# Patient Record
Sex: MALE
[De-identification: names, ages, dates, MRNs, and addresses within clinical notes are randomized per-mention and may not be internally consistent; named-entity substitution may affect disease eponyms.]

---

## 2017-06-14 NOTE — ADMIT CRITERIA FORM
Admission Criteria Documentation: 





            AMBULATORY SURGERY  EXCEPTION CRITERIA 





Ambulatory Surgery Exception Criteria


                                                                               (

Place 'X' for any and all applicable criteria): 





Surgery or procedure performed on ambulatory basis may require inpatient stay 

for[A] ANY ONE of the following(1)(2)(3)(4)(5)(6)(7)(8)(9):


[X] I.   A preoperative situation, condition, or finding that warrants 

inpatient stay as indicated by ANY  ONE of the following:


   []   a)   Inpatient care needed because of severity of a disease or 

condition rather than the surgery (eg, 


              severe cardiac or respiratory disease, severe infection) (15) (16

) (17) (18)


   []   b)   Emergent procedure (eg, angioplasty for acute ischemia)(19)


   []   c)   Complex surgical approach or situation as indicated by ANY  ONE of 

the following(3):


        []   i)    Open approach needed instead of usual endoscopic, 

transcatheter, or other less invasive procedure


        []   ii)   Difficult approach because of previous operation


        []   iii)  Airway monitoring required after open neck procedures(20)(21)


        []   iv)  Large mass requiring unusually extensive dissection


        []   v)  Additional complicating feature requiring inpatient care (eg, 

drain management)(22(23):


   [X]   d)   Major surgery in a pt with high anesthetic risk as indicated by 

ANY  ONE of the following (2)(3)(5)(7)(8):


        [X]   i)     ASA risk class III or higher (severe systemic disease 

impairing function) [D]


        []   ii)    Advanced age (eg, older than 85 years)(14)(24)             

                   


        []   iii)   Symptomatic heart failure(25)


        []   iv)   Symptomatic asthma or COPD(8)(21)


        []    v)   Morbid obesity with hemodynamic or respiratory problems(20)(

21)(26)(27)


        []   vi)   Obstructive sleep apnea(20)(21)


        []   vii)   Former premature infants who are younger than 60 weeks


        []   viii)  High risk for severe postoperative abnormalities (eg, 

severe postoperative hypocalcemia 


                    after parathyroidectomy for severe hyperparathyroidism)(27)(

28)


        []   ix)   Unstable angina(25) 


   []   e)  Drug-related risk requiring inpatient stay as indicated by ANY  ONE

  of the following(5)(10)(14)(32)(33)


        []   i)     Procedure requires discontinuing drugs or other therapy (eg

, antiarrhythmic    medication,   


                    antiseizure medication), which necessitates inpatient 

observation or treatment.(18)(31)


        []   ii)    Major surgery and high risk drug use as indicated by ANY  

ONE   of the following:


             []     1)    Active abuse of cocaine or similar drug      


             []     2)    Monoamine oxidase inhibitor use


             []     3)    Other drug identified as posing risk


   []   f)   Inadequate outpatient care situation as indicated by ANY  ONE  of 

the following(5)(10)(14)(32)(33)


        []   i)   Patient lives remote from medical facility and procedure has 

urgent complication potential, 


                  and temporary nearby residence cannot be arranged


        []  ii)   Patient will have postprocedure incapacitation and inadequate 

assistance at home, or alternative level of care cannot be arranged.


        []  iii)   Patient will have long general anesthesia or procedure side 

effect resolution time, and 


                  competent person to stay with patient on first postoperative 

night at home or alternative  level of care cannot be arranged.


        []iv)    Other inadequate outpatient situation that cannot be handled 

by other means


[] II.  A perioperative event, condition, or finding that warrants inpatient 

stay as indicated by ANY  ONE of the following (1)(2)(3):        


   []   a)   Inadequate physiologic recovery: cardiovascular,  respiratory, or 

hemodynamic status not normal or near preoperative baseline(18)                

                                                                               

          


   []   b)   Hemodynamic instability


   []   c)   Patient not alert with near normal or baseline mental status


   []   d)   Temperature not normal or as expected and not appropriate for 

outpatient treatment of condition 


   []   e)   Ambulatory or appropriate activity level status not yet achieved 

post procedure [E](34)(35)(36)


   []   f)    Operative site not appropriate (eg, unexpected or excessive 

drainage or bleeding)


   []   g)   Postoperative effects not resolved or adequately managed (eg, 

significant pain or vomiting not 


              appropriate for outpatient or next level of care)(10)(12)


   []   h)   Complicating features requiring inpatient care as indicated by ANY

  ONE  of the following(37):


        []   i)    Severe complications of procedure (eg, bowel injury, airway 

compromise, vascular injury,severe hemorrhage)


        []   ii)   Extensive (eg, dissection far beyond usual scope of procedure

) or prolonged (eg, 120 minutes  


                   beyond usual) surgery needed requiring inpatient 

postoperative care


        []   iii)  Conversion to an open or complex procedure that requires 

inpatient care (eg, open vs 


                   laparoscopic cholecystectomy, abdominal vs vaginal 

hysterectomy)(38)


        []   iv)  Comorbid condition or test result identified during or post 

procedure that requires inpatient care (7)


        []   v)   Malignant hyperthermia(30)


        []   vi)  Other complicating feature requiring inpatient care(22)(23)











Inpatient stay may be needed until ALL of the following are present (1)(2)(3)(4)

(5)(6)(10)(14)(33)(40):


[]a)   Physiologic recovery: cardiovascular, respiratory, and hemodynamic 

status normal or near preoperative baseline


[]b)   Hemodynamic stability    


[]c)   Patient alert, with near normal or baseline mental status


[]d)   Temperature appropriate: patient afebrile or temperature appropriate for 

outpt treatment of condition 


[]e)   Activity level appropriate: ambulatory or appropriate activity level 

post procedure


[]f)   Operative site appropriate as indicated by ALL of the following:


       []i)    Site dry or with expected drainage 


       []ii)   Any blood noted is as expected for procedure.


[]g)  Postoperative effects resolved or managed as indicated by ALL of the 

following:


       []i)    Pain management appropriate for outpatient (or next level of) 

care(10)


       []ii)   Minimal nausea and vomiting: if present, successfully treated 

with oral medication(12)


       []iii)   Headache, dizziness, or drowsiness (if present) are mild.


[]h)  Voiding status acceptable as indicated by ANY  ONE  of the following:


       []i)    Voiding spontaneously    


       []ii)   No voiding but instructions given for follow-up in 6 to 8 hours 


       []iii)  Urinary catheter in place, and instructions given for follow-up


[]i)   Complicating features requiring inpatient care manageable at a lower 

level of care(37)


[]j)   Comorbid conditions manageable at a lower level of care(37)











The original [employer] content created by [employer] has been revised. 


The portions of the content which have been revised are identified through the 

use of italic text or in bold, and [employer] 


has neither reviewed nor approved the modified material. All other unmodified 

content is copyright  [employer].





Please see references footnoted in the original [employer] edition 

2016

## 2017-06-15 ENCOUNTER — HOSPITAL ENCOUNTER (OUTPATIENT)
Dept: HOSPITAL 5 - OR | Age: 77
Discharge: HOME | End: 2017-06-15
Attending: UROLOGY
Payer: MEDICARE

## 2017-06-15 VITALS — SYSTOLIC BLOOD PRESSURE: 151 MMHG | DIASTOLIC BLOOD PRESSURE: 77 MMHG

## 2017-06-15 DIAGNOSIS — N40.0: ICD-10-CM

## 2017-06-15 DIAGNOSIS — N20.0: Primary | ICD-10-CM

## 2017-06-15 DIAGNOSIS — Z79.899: ICD-10-CM

## 2017-06-15 DIAGNOSIS — Z85.46: ICD-10-CM

## 2017-06-15 DIAGNOSIS — N28.1: ICD-10-CM

## 2017-06-15 DIAGNOSIS — N13.30: ICD-10-CM

## 2017-06-15 DIAGNOSIS — I10: ICD-10-CM

## 2017-06-15 DIAGNOSIS — N32.89: ICD-10-CM

## 2017-06-15 DIAGNOSIS — I25.2: ICD-10-CM

## 2017-06-15 DIAGNOSIS — N35.9: ICD-10-CM

## 2017-06-15 DIAGNOSIS — Z87.891: ICD-10-CM

## 2017-06-15 DIAGNOSIS — G47.33: ICD-10-CM

## 2017-06-15 PROCEDURE — 52332 CYSTOSCOPY AND TREATMENT: CPT

## 2017-06-15 PROCEDURE — C1758 CATHETER, URETERAL: HCPCS

## 2017-06-15 PROCEDURE — 93010 ELECTROCARDIOGRAM REPORT: CPT

## 2017-06-15 PROCEDURE — 87086 URINE CULTURE/COLONY COUNT: CPT

## 2017-06-15 PROCEDURE — A4217 STERILE WATER/SALINE, 500 ML: HCPCS

## 2017-06-15 PROCEDURE — 93005 ELECTROCARDIOGRAM TRACING: CPT

## 2017-06-15 PROCEDURE — C2617 STENT, NON-COR, TEM W/O DEL: HCPCS

## 2017-06-15 PROCEDURE — 82962 GLUCOSE BLOOD TEST: CPT

## 2017-06-15 PROCEDURE — C1769 GUIDE WIRE: HCPCS

## 2017-06-15 NOTE — POST OPERATIVE NOTE
Date of procedure: 06/15/17


Pre-op diagnosis: L renal // ureterqal stones 


Post-op diagnosis: same


Findings: 


hydro


Procedure: 





cysto L rpg j stent 


cancelled litho 


Anesthesia: GETA


Surgeon: HANG GRAJEDA


Estimated blood loss: none


Pathology: list (c an  s)


Specimen disposition: to lab


Condition: stable


Disposition: PACU

## 2017-06-15 NOTE — DISCHARGE SUMMARY
Short Stay Discharge Plan


Activity: other (no straining )


Weight Bearing Status: Full Weight Bearing


Diet: low fat, low cholesterol, low salt


Special Instructions: other (inc fluids )


Durable Medical Equipment Needed Upon Discharge: other (has j stent )


Follow up with: 


CLIFF DAVIS JR, MD [Primary Care Provider] - 7 Days


HANG GRAJEDA MD [Staff Physician] - 7 Days

## 2017-06-15 NOTE — ANESTHESIA CONSULTATION
Anesthesia Consult and Med Hx


Date of service: 06/15/17





- Airway


Anesthetic Teeth Evaluation: Dentures


ROM Head & Neck: Adequate


Mental/Hyoid Distance: Adequate


Mallampati Class: Class II


Intubation Access Assessment: Probably Good





- Pulmonary Exam


CTA: Yes





- Cardiac Exam


Cardiac Exam: RRR





- Pre-Operative Health Status


ASA Pre-Surgery Classification: ASA3


Proposed Anesthetic Plan: General





- Pulmonary


Hx Smoking: Yes (STOPPED X 60 YRS)


Hx Sleep Apnea: No (JUAN M PRE SCREEN HIGH RISK)





- Cardiovascular System


Hx Heart Attack/AMI: Yes (? 10 yrs ago)





- Central Nervous System


Hx Back Pain: Yes (FROM KIDNEY STONE)

## 2017-06-15 NOTE — ANESTHESIA DAY OF SURGERY
Anesthesia Day of Surgery





- Day of Surgery


Patient Examined: Yes


Patient H&P Reviewed: Yes


Patient is NPO: Yes


Beta Blockers: Yes

## 2017-06-15 NOTE — OPERATIVE REPORT
PREOPERATIVE DIAGNOSES:

1.  Renal cyst.

2.  Large renal stones obstructing left.

3.  Enlarged prostate.



POSTOPERATIVE DIAGNOSES:

1.  Renal cyst.

2.  Large renal stones obstructing left.

3.  Enlarged prostate.



PROCEDURE:  Cystoscopy with left retrograde, left double-J stent and collection

of left renal pelvic urine for culture.



SURGEON:  Grey Knight MD



ANESTHESIA:  General.



FINDINGS:  The gentleman with large stones left kidney, multiple renal cysts,

who now presents for treatment.  All risks and complications were discussed with

him and his daughter.



DESCRIPTION OF PROCEDURE:  The patient was brought to lithotripsy suite, placed

on the operating table.  Stones could not be localized.  This could be because

of their consistency, they may have mostly uric acid.  The patient was then

placed in the lithotomy position and he had mild benign prostatic hypertrophy

and some narrowing of the urethra, which was easily bypassed with the scope. 

There was 2+ trabeculation.  Retrograde showed a very narrowed left orifice with

stones.  I suspect that the UPJ which were radiolucent and in the lower pole

calyx.  A Glidewire coiled up in the kidney and we placed 6-British ____.



When we placed the first Glidewire, there was purulent material from the left

side, so we decided to abandon the lithotripsy.  There was moderate

hydronephrosis and a good diuresis.  Open-ended catheter was placed for culture.

 The patient tolerated the procedure well.  We placed a double-J stent in good

position.  Family notified.  Followup for possible ureteroscopy and possible

alkalinization.  I explained this in detail to the daughter.





DD: 06/15/2017 11:02

DT: 06/15/2017 13:20

JOB# [id number]  [id number]

ELMER/BARBARA

## 2017-07-12 ENCOUNTER — HOSPITAL ENCOUNTER (OUTPATIENT)
Dept: HOSPITAL 5 - OR | Age: 77
Discharge: HOME | End: 2017-07-12
Attending: UROLOGY
Payer: MEDICARE

## 2017-07-12 VITALS — DIASTOLIC BLOOD PRESSURE: 90 MMHG | SYSTOLIC BLOOD PRESSURE: 156 MMHG

## 2017-07-12 DIAGNOSIS — Z79.899: ICD-10-CM

## 2017-07-12 DIAGNOSIS — N20.2: Primary | ICD-10-CM

## 2017-07-12 DIAGNOSIS — I10: ICD-10-CM

## 2017-07-12 DIAGNOSIS — Z95.1: ICD-10-CM

## 2017-07-12 DIAGNOSIS — I25.10: ICD-10-CM

## 2017-07-12 DIAGNOSIS — Z87.891: ICD-10-CM

## 2017-07-12 DIAGNOSIS — Z85.46: ICD-10-CM

## 2017-07-12 DIAGNOSIS — E11.9: ICD-10-CM

## 2017-07-12 PROCEDURE — C1726 CATH, BAL DIL, NON-VASCULAR: HCPCS

## 2017-07-12 PROCEDURE — 82962 GLUCOSE BLOOD TEST: CPT

## 2017-07-12 PROCEDURE — C2617 STENT, NON-COR, TEM W/O DEL: HCPCS

## 2017-07-12 PROCEDURE — C1758 CATHETER, URETERAL: HCPCS

## 2017-07-12 PROCEDURE — C1769 GUIDE WIRE: HCPCS

## 2017-07-12 PROCEDURE — 74420 UROGRAPHY RTRGR +-KUB: CPT

## 2017-07-12 PROCEDURE — 52356 CYSTO/URETERO W/LITHOTRIPSY: CPT

## 2017-07-12 NOTE — ANESTHESIA DAY OF SURGERY
Anesthesia Day of Surgery





- Day of Surgery


Patient Examined: Yes


Patient H&P Reviewed: Yes


Patient is NPO: Yes


Beta Blockers: No (did not take this am)

## 2017-07-12 NOTE — POST OPERATIVE NOTE
Date of procedure: 07/12/17


Pre-op diagnosis: ureteral stones


Post-op diagnosis: same


Findings: 





As above


Procedure: 





Cystoscopy left flexible ureteroscopy


Op note


Preoperative diagnosis left ureteral and renal stones


Postoperative diagnosis the same procedure cystoscopy stent exchange left 

ureteroscopy left retrograde left rigid and flexible ureteroscopy stone 

extraction left nephroscopy laser of stones double-J stent


Surgeon Dr. RITU Pedraza Gen.


Findings as above


Procedure


This was brought to the operating room placed on the operating table.


Following the induction of general anesthesia placed in lithotomy position 

prepped and draped in usual sterile fashion


The stent was revealed and removed over a wire in the kidney


We tried flexible ureteroscopy but we saw prominent stones in the ureter could 

not get tested.  We then used the rigid ureteroscope and laser to stone and 

remove multiple fragments to be given to the patient's family.  At this point 

the ureter was quite narrow we used the access sheath to the mid ureter and was 

able to pass the flexible scope to the kidney.


Large stones were seen which were yellow not visualized on the radiographic 

studies so could not be done with ESWL


At this point the stones were followed into the lower and mid calyces and 

fragmented into multiple pieces with the 200 fiber laser


Patient of procedure well a 7 Hungarian 24 cm double-J was replaced and was 

brought to recovery room in stable condition without the string minimal blood 

loss


















































Anesthesia: ANTONIOA


Surgeon: HANG GRAJEDA


Estimated blood loss: minimal


Pathology: none


Condition: stable


Disposition: PACU

## 2017-07-12 NOTE — DISCHARGE SUMMARY
Short Stay Discharge Plan


Activity: other (no straining)


Weight Bearing Status: Full Weight Bearing


Diet: low cholesterol, low salt


Special Instructions: other (increase fluids)


Durable Medical Equipment Needed Upon Discharge: other (patient has double-J 

stent needs follow-up)


Follow up with: 


CLIFF DAVIS JR, MD [Primary Care Provider] - 7 Days


HANG GRAJEDA MD [Staff Physician] - 7 Days

## 2017-07-12 NOTE — ANESTHESIA CONSULTATION
Anesthesia Consult and Med Hx


Date of service: 07/12/17





- Airway


Anesthetic Teeth Evaluation: Edentulous


ROM Head & Neck: Adequate


Mental/Hyoid Distance: Adequate


Mallampati Class: Class II


Intubation Access Assessment: Probably Good





- Pulmonary Exam


CTA: Yes





- Cardiac Exam


Cardiac Exam: RRR





- Pre-Operative Health Status


ASA Pre-Surgery Classification: ASA3


Proposed Anesthetic Plan: General





- Pulmonary


Hx Smoking: Yes (STOPPED X 60 YRS)


Hx Sleep Apnea: No (JUAN M PRE SCREEN HIGH RISK)





- Cardiovascular System


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


Hx Angina: No (Patient is active with no chest pain or tightness)





- Central Nervous System


Hx Back Pain: Yes (FROM KIDNEY STONE)





- Endocrine


Hx Insulin Dependent Diabetes: Yes





- Additional Comments


Anesthesia Medical History Comments: Recently had cardiologist extend his check 

up interval from 3 months to 1 year

## 2017-07-13 NOTE — FLUOROSCOPY REPORT
FLUOROSCOPY RETROGRADE UROGRAPHY



History: Left ureteral stones.



Findings: No comparison. Fluoroscopy was provided by radiology during 

retrograde urography by Dr. Knight.  film demonstrates a left 

ureteral stent which is in good position and densities overlying the 

course of the left ureter consistent with ureteral stones. Left 

ureteroscopy, laser and grasper was used to remove the left ureteral 

stones. The left ureteral stent was exchanged which is in good position 

on the final image. Please correlate with the procedural report.



Impression:

Removal of left ureteral stones. Left ureteral stent exchange.

## 2019-12-02 ENCOUNTER — HOSPITAL ENCOUNTER (OUTPATIENT)
Dept: HOSPITAL 5 - OR | Age: 79
Discharge: HOME | End: 2019-12-02
Attending: UROLOGY
Payer: MEDICARE

## 2019-12-02 VITALS — SYSTOLIC BLOOD PRESSURE: 130 MMHG | DIASTOLIC BLOOD PRESSURE: 62 MMHG

## 2019-12-02 DIAGNOSIS — Z87.891: ICD-10-CM

## 2019-12-02 DIAGNOSIS — I10: ICD-10-CM

## 2019-12-02 DIAGNOSIS — Z98.42: ICD-10-CM

## 2019-12-02 DIAGNOSIS — Z79.899: ICD-10-CM

## 2019-12-02 DIAGNOSIS — E78.2: ICD-10-CM

## 2019-12-02 DIAGNOSIS — N35.919: ICD-10-CM

## 2019-12-02 DIAGNOSIS — Z79.4: ICD-10-CM

## 2019-12-02 DIAGNOSIS — Z79.82: ICD-10-CM

## 2019-12-02 DIAGNOSIS — Z98.41: ICD-10-CM

## 2019-12-02 DIAGNOSIS — I65.23: ICD-10-CM

## 2019-12-02 DIAGNOSIS — Z79.84: ICD-10-CM

## 2019-12-02 DIAGNOSIS — I25.810: ICD-10-CM

## 2019-12-02 DIAGNOSIS — Z95.2: ICD-10-CM

## 2019-12-02 DIAGNOSIS — R31.0: Primary | ICD-10-CM

## 2019-12-02 DIAGNOSIS — E11.9: ICD-10-CM

## 2019-12-02 DIAGNOSIS — Z95.1: ICD-10-CM

## 2019-12-02 PROCEDURE — 74420 UROGRAPHY RTRGR +-KUB: CPT

## 2019-12-02 PROCEDURE — 82962 GLUCOSE BLOOD TEST: CPT

## 2019-12-02 PROCEDURE — C1758 CATHETER, URETERAL: HCPCS

## 2019-12-02 PROCEDURE — 88305 TISSUE EXAM BY PATHOLOGIST: CPT

## 2019-12-02 PROCEDURE — 52224 CYSTOSCOPY AND TREATMENT: CPT

## 2019-12-02 NOTE — POST OPERATIVE NOTE
Date of procedure: 12/02/19


Pre-op diagnosis: hematuria 


Post-op diagnosis: same


Findings: 





bladder tumors 


Procedure: 





cysto turbt rpgs 


Anesthesia: GETA


Surgeon: HANG GRAJEDA


Estimated blood loss: minimal


Pathology: list (bladder)


Specimen disposition: to lab


Condition: stable


Disposition: PACU

## 2019-12-02 NOTE — ANESTHESIA CONSULTATION
Anesthesia Consult and Med Hx


Date of service: 12/02/19





- Airway


Anesthetic Teeth Evaluation: Edentulous


ROM Head & Neck: Adequate


Mental/Hyoid Distance: Adequate


Mallampati Class: Class II


Intubation Access Assessment: Good





- Pre-Operative Health Status


ASA Pre-Surgery Classification: ASA3


Proposed Anesthetic Plan: General





- Pulmonary


Hx Smoking: Yes (STOPPED X 60 YRS)


Hx Sleep Apnea: No (JUAN M PRE SCREEN HIGH RISK)





- Cardiovascular System


Hx Hypertension: Yes


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


Hx Angina: No (Patient is active with no chest pain or tightness)


Hx Valvular Heart Disease: Yes (AVR)


Hx Peripheral Vascular Disease: Yes (Had CEA 7 weeks ago. +Cardiac clearance 

(ECHO, NST))





- Central Nervous System


Hx Back Pain: Yes (FROM KIDNEY STONE)


Hx Psychiatric Problems: No





- Gastrointestinal


Hx Gastroesophageal Reflux Disease: Yes





- Endocrine


Hx Insulin Dependent Diabetes: Yes





- Hematic


Hx Anemia: No





- Other Systems


Hx Alcohol Use: No


Hx Substance Use: No


Hx Cancer: Yes (Prostate)

## 2019-12-02 NOTE — FLUOROSCOPY REPORT
Retrograde ureterography. 12/2/2019.



HISTORY: Gross hematuria.



FINDINGS: Retrograde injection of the right renal collecting system. A filling defect at the UVJ is n
onobstructing. This may represent an injected air bubble. Mild irregularity is seen at the distal ure
ter. No significant obstruction.



Injection of the left collecting system is unremarkable.



Fluoroscopy time: 30 seconds. 6 fluoroscopic images were obtained.



Signer Name: Jb Link MD 

Signed: 12/2/2019 10:59 AM

 Workstation Name: [id number]

## 2019-12-02 NOTE — OPERATIVE REPORT
PREOPERATIVE DIAGNOSES:  Gross hematuria, previous radiation for prostate.



POSTOPERATIVE DIAGNOSIS:  Two papillary bladder tumors.



PROCEDURE:  Cystoscopy, transurethral resection of bladder tumor.



SURGEON:  Dr. Knight.



ANESTHESIA:  General.



FINDINGS:  This is a gentleman who presented with gross hematuria.  CT was

unremarkable for any significant pathology, but he continues to bleed.  He was

on blood thinners.  He had to have a carotid surgery.  He now presents for

treatment.



DESCRIPTION OF PROCEDURE:  The patient was brought to the operating room and

placed on the operating table.  Following induction of anesthesia, he was placed

in lithotomy position, prepped and draped in usual sterile fashion.  There was a

urethral stricture that had to be dilated.  Once we were able to get into the

bladder, the urethral epithelium was blanched.  Bladder neck was open.  We got

into the bladder and there were two tumors, one on the right posterior lateral

wall and one at the dome.  Retrograde showed good filling and good drainage. 

The tumors were extracted.  The smaller one was grabbed with a rigid biopsy

instrument and then deeper biopsies were taken.  The larger one was resected. 

The patient tolerated the procedure well.  We did see muscle fibers.  There were

no complications.  The area was cauterized.  A 22 Councill catheter was placed,

brought to recovery in stable condition.





DD: 12/02/2019 10:27

DT: 12/02/2019 10:35

JOB# [id number]  [id number]

ELMER/BARBARA

## 2019-12-02 NOTE — DISCHARGE SUMMARY
Short Stay Discharge Plan


Activity: other (no straining )


Weight Bearing Status: Full Weight Bearing


Diet: low fat, low cholesterol


Special Instructions: other (inc fluids )


Durable Medical Equipment Needed Upon Discharge: other (davis )


Follow up with: 


CLIFF DAVIS JR, MD [Primary Care Provider] - 7 Days


HANG GRAJEDA MD [Staff Physician] - 7 Days

## 2020-03-16 ENCOUNTER — HOSPITAL ENCOUNTER (OUTPATIENT)
Dept: HOSPITAL 5 - OR | Age: 80
Discharge: HOME | End: 2020-03-16
Attending: UROLOGY
Payer: MEDICARE

## 2020-03-16 VITALS — DIASTOLIC BLOOD PRESSURE: 77 MMHG | SYSTOLIC BLOOD PRESSURE: 130 MMHG

## 2020-03-16 DIAGNOSIS — R31.9: ICD-10-CM

## 2020-03-16 DIAGNOSIS — Z85.46: ICD-10-CM

## 2020-03-16 DIAGNOSIS — Z95.2: ICD-10-CM

## 2020-03-16 DIAGNOSIS — M19.90: ICD-10-CM

## 2020-03-16 DIAGNOSIS — Z98.890: ICD-10-CM

## 2020-03-16 DIAGNOSIS — C67.4: Primary | ICD-10-CM

## 2020-03-16 DIAGNOSIS — Z95.1: ICD-10-CM

## 2020-03-16 DIAGNOSIS — K21.9: ICD-10-CM

## 2020-03-16 DIAGNOSIS — Z79.899: ICD-10-CM

## 2020-03-16 DIAGNOSIS — Z79.84: ICD-10-CM

## 2020-03-16 DIAGNOSIS — Z90.49: ICD-10-CM

## 2020-03-16 DIAGNOSIS — Z98.49: ICD-10-CM

## 2020-03-16 DIAGNOSIS — I10: ICD-10-CM

## 2020-03-16 DIAGNOSIS — Z79.82: ICD-10-CM

## 2020-03-16 DIAGNOSIS — E78.00: ICD-10-CM

## 2020-03-16 DIAGNOSIS — E11.51: ICD-10-CM

## 2020-03-16 DIAGNOSIS — I73.9: ICD-10-CM

## 2020-03-16 DIAGNOSIS — Z87.442: ICD-10-CM

## 2020-03-16 DIAGNOSIS — Z79.4: ICD-10-CM

## 2020-03-16 DIAGNOSIS — Z87.891: ICD-10-CM

## 2020-03-16 DIAGNOSIS — I25.10: ICD-10-CM

## 2020-03-16 PROCEDURE — 52234 CYSTOSCOPY AND TREATMENT: CPT

## 2020-03-16 PROCEDURE — 82962 GLUCOSE BLOOD TEST: CPT

## 2020-03-16 PROCEDURE — A4217 STERILE WATER/SALINE, 500 ML: HCPCS

## 2020-03-16 PROCEDURE — 88305 TISSUE EXAM BY PATHOLOGIST: CPT

## 2020-03-16 RX ADMIN — FENTANYL CITRATE PRN MCG: 50 INJECTION, SOLUTION INTRAMUSCULAR; INTRAVENOUS at 10:09

## 2020-03-16 RX ADMIN — FENTANYL CITRATE PRN MCG: 50 INJECTION, SOLUTION INTRAMUSCULAR; INTRAVENOUS at 10:20

## 2020-03-16 NOTE — DISCHARGE SUMMARY
Short Stay Discharge Plan


Activity: other (no straining )


Weight Bearing Status: Full Weight Bearing


Diet: low fat, low cholesterol, low salt


Special Instructions: other (davis care )


Durable Medical Equipment Needed Upon Discharge: other (catheter)


Follow up with: 


CLIFF DAVIS JR, MD [Primary Care Provider] - 7 Days


HANG GRAJEDA MD [Staff Physician] - 7 Days

## 2020-03-16 NOTE — OPERATIVE REPORT
PREOPERATIVE DIAGNOSIS:  The patient has recurrent bladder cancer.



POSTOPERATIVE DIAGNOSIS:  The patient has recurrent bladder cancer.



PROCEDURE:  Resection of 2 small bladder tumors.



SURGEON:  Dr. Knight.



ANESTHESIA:  General.



FINDINGS:  This is a gentleman with history of papillary bladder tumors and

intermittent hematuria.  He now presents for treatment.



PROCEDURE:  The patient was brought to the operating room and placed on the

operating table.  Following induction of anesthesia, placed in lithotomy

position, prepped and draped in usual sterile fashion.  Cystourethroscopy showed

a tumor, probably a 9 mm posterior wall, which was extracted and cauterized.  At

the bladder neck, there was a tumor at the 5 o'clock position, elevated, looked

a little papillary.  This was resected with the 24 resectoscope.  The patient

tolerated the procedure well.  No significant bleeding.  Area was cauterized.  A

22-coude catheter was placed, brought to recovery in stable condition.





DD: 03/16/2020 12:08

DT: 03/16/2020 12:27

JOB# [id number]  [id number]

ELMER/BARBARA

## 2020-03-16 NOTE — ANESTHESIA DAY OF SURGERY
Anesthesia Day of Surgery





- Day of Surgery


Patient Examined: Yes


Patient H&P Reviewed: Yes


Patient is NPO: Yes


Beta Blockers: Yes (metoprolol this morning)

## 2020-03-16 NOTE — ANESTHESIA CONSULTATION
Anesthesia Consult and Med Hx


Date of service: 03/16/20





- Airway


Anesthetic Teeth Evaluation: Edentulous


ROM Head & Neck: Adequate


Mental/Hyoid Distance: Adequate


Mallampati Class: Class II


Intubation Access Assessment: Probably Good (unable to access previous anes 

record for review)





- Pulmonary Exam


CTA: Yes





- Cardiac Exam


Cardiac Exam: RRR





- Pre-Operative Health Status


ASA Pre-Surgery Classification: ASA3


Proposed Anesthetic Plan: General





- Pulmonary


Hx Smoking: Yes (STOPPED X 60 YRS)


Hx Respiratory Symptoms: No


Hx Sleep Apnea: No (JUAN M PRE SCREEN HIGH RISK)





- Cardiovascular System


Hx Hypertension: Yes (took metoprolol and enalapril this mornign)


Hx Coronary Artery Disease: Yes (s/p CABG >10yrs ago)


Hx Angina: No


Hx Percutaneous Transluminal Coronary Angioplasty (PTCA): Yes (>10 yrs ago)


Hx Cardia Arrhythmia: No


Hx Valvular Heart Disease: Yes (remote hx AVR)


Hx Peripheral Vascular Disease: Yes





- Central Nervous System


CVA: No





- Gastrointestinal


Hx Gastroesophageal Reflux Disease: Yes (diet controlled)





- Endocrine


Hx Renal Disease: No


Hx Liver Disease: No


Hx Insulin Dependent Diabetes: Yes


Hx Thyroid Disease: No





- Other Systems


Hx Cancer: Yes (hx bladder ca)


Hx Obesity: No





- Additional Comments


Anesthesia Medical History Comments: No hx anesthetic complications.

## 2020-07-10 LAB
ALBUMIN SERPL-MCNC: 4.2 G/DL (ref 3.9–5)
ALT SERPL-CCNC: 15 UNITS/L (ref 7–56)
BUN SERPL-MCNC: 40 MG/DL (ref 9–20)
BUN/CREAT SERPL: 25 %
CALCIUM SERPL-MCNC: 9.8 MG/DL (ref 8.4–10.2)
HCT VFR BLD CALC: 29.3 % (ref 35.5–45.6)
HEMOLYSIS INDEX: 1
HGB BLD-MCNC: 8.8 GM/DL (ref 11.8–15.2)
MCHC RBC AUTO-ENTMCNC: 30 % (ref 32–34)
MCV RBC AUTO: 73 FL (ref 84–94)
PLATELET # BLD: 256 K/MM3 (ref 140–440)
RBC # BLD AUTO: 4.04 M/MM3 (ref 3.65–5.03)

## 2020-07-13 ENCOUNTER — HOSPITAL ENCOUNTER (OUTPATIENT)
Dept: HOSPITAL 5 - OR | Age: 80
Discharge: HOME | End: 2020-07-13
Attending: UROLOGY
Payer: MEDICARE

## 2020-07-13 VITALS — SYSTOLIC BLOOD PRESSURE: 138 MMHG | DIASTOLIC BLOOD PRESSURE: 75 MMHG

## 2020-07-13 DIAGNOSIS — Z98.49: ICD-10-CM

## 2020-07-13 DIAGNOSIS — C67.5: ICD-10-CM

## 2020-07-13 DIAGNOSIS — Z90.49: ICD-10-CM

## 2020-07-13 DIAGNOSIS — E78.00: ICD-10-CM

## 2020-07-13 DIAGNOSIS — M19.90: ICD-10-CM

## 2020-07-13 DIAGNOSIS — C67.4: Primary | ICD-10-CM

## 2020-07-13 DIAGNOSIS — Z85.46: ICD-10-CM

## 2020-07-13 DIAGNOSIS — Z95.1: ICD-10-CM

## 2020-07-13 DIAGNOSIS — Z87.442: ICD-10-CM

## 2020-07-13 DIAGNOSIS — I10: ICD-10-CM

## 2020-07-13 DIAGNOSIS — Z11.59: ICD-10-CM

## 2020-07-13 DIAGNOSIS — K21.9: ICD-10-CM

## 2020-07-13 DIAGNOSIS — I25.10: ICD-10-CM

## 2020-07-13 DIAGNOSIS — Z86.2: ICD-10-CM

## 2020-07-13 DIAGNOSIS — E11.51: ICD-10-CM

## 2020-07-13 DIAGNOSIS — Z95.4: ICD-10-CM

## 2020-07-13 DIAGNOSIS — Z98.890: ICD-10-CM

## 2020-07-13 DIAGNOSIS — I73.9: ICD-10-CM

## 2020-07-13 LAB
ALBUMIN SERPL-MCNC: 3.8 G/DL (ref 3.9–5)
ALT SERPL-CCNC: 12 UNITS/L (ref 7–56)
BUN SERPL-MCNC: 33 MG/DL (ref 9–20)
BUN/CREAT SERPL: 25 %
CALCIUM SERPL-MCNC: 9.2 MG/DL (ref 8.4–10.2)
HEMOLYSIS INDEX: 3

## 2020-07-13 PROCEDURE — 36415 COLL VENOUS BLD VENIPUNCTURE: CPT

## 2020-07-13 PROCEDURE — 52234 CYSTOSCOPY AND TREATMENT: CPT

## 2020-07-13 PROCEDURE — 88305 TISSUE EXAM BY PATHOLOGIST: CPT

## 2020-07-13 PROCEDURE — 52204 CYSTOSCOPY W/BIOPSY(S): CPT

## 2020-07-13 PROCEDURE — A4217 STERILE WATER/SALINE, 500 ML: HCPCS

## 2020-07-13 PROCEDURE — 80053 COMPREHEN METABOLIC PANEL: CPT

## 2020-07-13 PROCEDURE — 85027 COMPLETE CBC AUTOMATED: CPT

## 2020-07-13 PROCEDURE — 86901 BLOOD TYPING SEROLOGIC RH(D): CPT

## 2020-07-13 PROCEDURE — 86900 BLOOD TYPING SEROLOGIC ABO: CPT

## 2020-07-13 PROCEDURE — 86850 RBC ANTIBODY SCREEN: CPT

## 2020-07-13 PROCEDURE — 82962 GLUCOSE BLOOD TEST: CPT

## 2020-07-13 NOTE — OPERATIVE REPORT
PREOPERATIVE DIAGNOSIS:  Recurrent bladder tumors.



POSTOPERATIVE DIAGNOSIS:  Recurrent bladder tumors.



PROCEDURE:  Cystoscopy, transurethral resection of bladder tumors and excision

of bladder tumors fulguration.



SURGEON:  Dr. Knight.



ANESTHESIA:  General.



DESCRIPTION OF PROCEDURE:  This is a gentleman with recurrent tumors.  He had

two very tiny tumors posterior wall separate about 4 cm apart.  These were very

small, measuring about 8 mm each.  These were excised.  At the bladder neck,

there was some papillary areas which were resected.  The patient tolerated the

procedure well.  Specimen sent separately to pathology.  A random posterior wall

biopsy was also done.  The patient tolerated the procedure well and brought to

recovery room with 22 coude urine clear in stable condition.





DD: 07/13/2020 09:09

DT: 07/13/2020 09:18

JOB# [id number]  [id number]

ELMER/BARBARA

## 2020-07-13 NOTE — POST OPERATIVE NOTE
Date of procedure: 07/13/20


Pre-op diagnosis: bladder tumor 


Post-op diagnosis: same


Findings: 





small lesions 


Procedure: 





cysto turbt bx 


Anesthesia: GETA


Surgeon: HANG GRAJEDA


Estimated blood loss: minimal


Pathology: list (bladder)


Specimen disposition: to lab


Condition: stable


Disposition: PACU

## 2020-07-13 NOTE — ANESTHESIA CONSULTATION
Anesthesia Consult and Med Hx


Date of service: 07/13/20





- Airway


Anesthetic Teeth Evaluation: Dentures (upper and lower), Edentulous


ROM Head & Neck: Adequate


Mental/Hyoid Distance: Adequate


Mallampati Class: Class III


Intubation Access Assessment: Possibly Difficult





- Pre-Operative Health Status


ASA Pre-Surgery Classification: ASA3


Proposed Anesthetic Plan: General





- Pulmonary


Hx Smoking: Yes (STOPPED X 60 YRS)


Hx Respiratory Symptoms: No


Hx Sleep Apnea: No (JUAN M PRE SCREEN HIGH RISK  )





- Cardiovascular System


Hx Hypertension: Yes


Hx Coronary Artery Disease: Yes (CABG x3 (2014))


Hx Heart Attack/AMI: No


Hx Angina: No


Hx Percutaneous Transluminal Coronary Angioplasty (PTCA): Yes (>10 yrs ago)


Hx Cardia Arrhythmia: No


Hx Valvular Heart Disease: Yes (Aortic valve replacement (2014))


Hx Peripheral Vascular Disease: Yes





- Central Nervous System


CVA: No


Hx Back Pain: Yes (FROM KIDNEY STONE)


Hx Psychiatric Problems: No





- Gastrointestinal


Hx Gastroesophageal Reflux Disease: Yes (diet controlled)





- Endocrine


Hx Renal Disease: Yes (kidney stones, h/o bladder CA, prostate CA)


Hx Liver Disease: No


Hx Insulin Dependent Diabetes: Yes


Hx Thyroid Disease: No





- Hematic


Hx Anemia: Yes





- Other Systems


Hx Alcohol Use: No


Hx Substance Use: No


Hx Cancer: Yes (hx bladder ca, Prostate CA (2009) )


Hx Obesity: No

## 2020-07-13 NOTE — DISCHARGE SUMMARY
Short Stay Discharge Plan


Activity: other (no straining )


Weight Bearing Status: Full Weight Bearing


Diet: low fat, low cholesterol, low salt


Special Instructions: other (inc fluids )


Durable Medical Equipment Needed Upon Discharge: other (teach davis care )


Follow up with: 


CLIFF DAVIS JR, MD [Primary Care Provider] - 7 Days


HANG GRAJEDA MD [Staff Physician] - 3 Days


Forms:  [facility]

## 2020-12-23 LAB
ALBUMIN SERPL-MCNC: 3.9 G/DL (ref 3.9–5)
ALT SERPL-CCNC: 14 UNITS/L (ref 7–56)
BUN SERPL-MCNC: 33 MG/DL (ref 9–20)
BUN/CREAT SERPL: 24 %
CALCIUM SERPL-MCNC: 9.2 MG/DL (ref 8.4–10.2)
HCT VFR BLD CALC: 30.6 % (ref 35.5–45.6)
HEMOLYSIS INDEX: 2
HGB BLD-MCNC: 9.4 GM/DL (ref 11.8–15.2)
MCHC RBC AUTO-ENTMCNC: 31 % (ref 32–34)
MCV RBC AUTO: 77 FL (ref 84–94)
PLATELET # BLD: 260 K/MM3 (ref 140–440)
RBC # BLD AUTO: 3.98 M/MM3 (ref 3.65–5.03)

## 2020-12-28 ENCOUNTER — HOSPITAL ENCOUNTER (OUTPATIENT)
Dept: HOSPITAL 5 - OR | Age: 80
Discharge: HOME | End: 2020-12-28
Attending: UROLOGY
Payer: MEDICARE

## 2020-12-28 VITALS — DIASTOLIC BLOOD PRESSURE: 69 MMHG | SYSTOLIC BLOOD PRESSURE: 127 MMHG

## 2020-12-28 DIAGNOSIS — Z98.890: ICD-10-CM

## 2020-12-28 DIAGNOSIS — Z79.4: ICD-10-CM

## 2020-12-28 DIAGNOSIS — E78.00: ICD-10-CM

## 2020-12-28 DIAGNOSIS — I25.10: ICD-10-CM

## 2020-12-28 DIAGNOSIS — Z79.82: ICD-10-CM

## 2020-12-28 DIAGNOSIS — Z98.49: ICD-10-CM

## 2020-12-28 DIAGNOSIS — C67.1: Primary | ICD-10-CM

## 2020-12-28 DIAGNOSIS — Z85.46: ICD-10-CM

## 2020-12-28 DIAGNOSIS — Z86.2: ICD-10-CM

## 2020-12-28 DIAGNOSIS — K21.9: ICD-10-CM

## 2020-12-28 DIAGNOSIS — Z79.899: ICD-10-CM

## 2020-12-28 DIAGNOSIS — Z95.1: ICD-10-CM

## 2020-12-28 DIAGNOSIS — Z87.891: ICD-10-CM

## 2020-12-28 DIAGNOSIS — E11.51: ICD-10-CM

## 2020-12-28 DIAGNOSIS — Z87.442: ICD-10-CM

## 2020-12-28 DIAGNOSIS — Z90.49: ICD-10-CM

## 2020-12-28 DIAGNOSIS — Z20.828: ICD-10-CM

## 2020-12-28 DIAGNOSIS — M19.90: ICD-10-CM

## 2020-12-28 DIAGNOSIS — Z95.2: ICD-10-CM

## 2020-12-28 DIAGNOSIS — I10: ICD-10-CM

## 2020-12-28 PROCEDURE — 85027 COMPLETE CBC AUTOMATED: CPT

## 2020-12-28 PROCEDURE — 74420 UROGRAPHY RTRGR +-KUB: CPT

## 2020-12-28 PROCEDURE — 36415 COLL VENOUS BLD VENIPUNCTURE: CPT

## 2020-12-28 PROCEDURE — C1758 CATHETER, URETERAL: HCPCS

## 2020-12-28 PROCEDURE — 82962 GLUCOSE BLOOD TEST: CPT

## 2020-12-28 PROCEDURE — A4217 STERILE WATER/SALINE, 500 ML: HCPCS

## 2020-12-28 PROCEDURE — 52240 CYSTOSCOPY AND TREATMENT: CPT

## 2020-12-28 PROCEDURE — U0003 INFECTIOUS AGENT DETECTION BY NUCLEIC ACID (DNA OR RNA); SEVERE ACUTE RESPIRATORY SYNDROME CORONAVIRUS 2 (SARS-COV-2) (CORONAVIRUS DISEASE [COVID-19]), AMPLIFIED PROBE TECHNIQUE, MAKING USE OF HIGH THROUGHPUT TECHNOLOGIES AS DESCRIBED BY CMS-2020-01-R: HCPCS

## 2020-12-28 PROCEDURE — 80053 COMPREHEN METABOLIC PANEL: CPT

## 2020-12-28 PROCEDURE — 88305 TISSUE EXAM BY PATHOLOGIST: CPT

## 2020-12-28 NOTE — ANESTHESIA CONSULTATION
Anesthesia Consult and Med Hx


Date of service: 12/28/20





- Airway


Anesthetic Teeth Evaluation: Good


ROM Head & Neck: Adequate


Mental/Hyoid Distance: Adequate


Mallampati Class: Class II


Intubation Access Assessment: Good





- Pulmonary Exam


CTA: Yes





- Cardiac Exam


Cardiac Exam: RRR





- Pre-Operative Health Status


ASA Pre-Surgery Classification: ASA3


Proposed Anesthetic Plan: General





- Pulmonary


Hx Smoking: Yes (STOPPED X 60 YRS)


Hx Respiratory Symptoms: No


Hx Sleep Apnea: No (JUAN M PRE SCREEN HIGH RISK  )





- Cardiovascular System


Hx Hypertension: Yes


Hx Coronary Artery Disease: Yes (CABG x3 (2014),  L CEA 6 months ago. R CEA last

year)


Hx Heart Attack/AMI: No


Hx Angina: No


Hx Percutaneous Transluminal Coronary Angioplasty (PTCA): Yes (>10 yrs ago)


Hx Cardia Arrhythmia: No


Hx Valvular Heart Disease: Yes (Aortic valve replacement (2014))


Hx Peripheral Vascular Disease: Yes (VARICOSITIES OF LEGS)





- Central Nervous System


CVA: No


Hx Back Pain: Yes


Hx Psychiatric Problems: No





- Gastrointestinal


Hx Gastroesophageal Reflux Disease: Yes (diet controlled)





- Endocrine


Hx Renal Disease: Yes (kidney stones, h/o bladder CA, prostate CA)


Hx Liver Disease: No


Hx Insulin Dependent Diabetes: Yes


Hx Thyroid Disease: No





- Hematic


Hx Anemia: Yes





- Other Systems


Hx Alcohol Use: No


Hx Substance Use: No


Hx Cancer: Yes (hx bladder ca, Prostate CA (2009) )


Hx Obesity: No

## 2020-12-28 NOTE — DISCHARGE SUMMARY
Short Stay Discharge Plan


Activity: other (no strainig )


Weight Bearing Status: Full Weight Bearing


Diet: low fat, low cholesterol, low salt


Special Instructions: other (teach davis care )


Follow up with: 


CLIFF DAVIS JR, MD [Primary Care Provider] - 7 Days


HANG GRAJEDA MD [Staff Physician] - 3 Days

## 2020-12-28 NOTE — POST OPERATIVE NOTE
Date of procedure: 12/28/20


Pre-op diagnosis: bladder tumors


Post-op diagnosis: same


Findings: 





prv seeds 


Procedure: 





cysto remval tumors rpgs


Anesthesia: GETA


Surgeon: HANG GRAEJDA


Estimated blood loss: minimal


Pathology: list (bt's)


Specimen disposition: to lab


Condition: stable


Disposition: PACU

## 2020-12-28 NOTE — FLUOROSCOPY REPORT
FLUOROSCOPY RETROGRADE UROGRAPHY



HISTORY: TURBT, bladder cancer



FINDINGS: Fluoroscopy was provided by radiology during retrograde urography by the urologist. There i
s normal filling of both renal collecting systems. No filling defect or abnormal dilatation is identi
fied.



IMPRESSION: Unremarkable bilateral retrograde pyelograms



Fluoroscopy time: 0.3 minutes



Fluoroscopic images: 5



Signer Name: Frederick Dennis Jr, MD 

Signed: 12/28/2020 9:58 AM

Workstation Name: [id number]

## 2020-12-28 NOTE — OPERATIVE REPORT
PREOPERATIVE DIAGNOSES:  Recurrent bladder cancer, history of prostate cancer,

radiation seeds.



POSTOPERATIVE DIAGNOSES:  Recurrent bladder cancer, history of prostate cancer,

radiation seeds.



PROCEDURE:  Cystoscopy, removal of bladder tumor and retrograde.



SURGEON:  Grey Knight MD.



ANESTHESIA:  General.



FINDINGS:  This is a gentleman with bladder cancer.  He has a history of

prostate cancer as well with radiation.  He now presents for treatment.



DESCRIPTION OF PROCEDURE:  The patient was brought to the operating table. 

Following the induction of anesthesia, placed in lithotomy position, prepped and

draped in usual sterile fashion.  Cystourethroscopy showed some scarring at the

prostatic urethra where there were seeds placed.  There were no seeds eroded in

the urethra.  Cystoscopy showed three tumors well up at the dome of the bladder

anteriorly very difficult to access.  We had to put him a little deeper and

anesthesia and these tumors were excised.  Each one was about 8-9 mm.  The area

was cauterized.  One of them, which was small and we just cauterized it.  The

patient tolerated the procedure well.  A 20-Kyr[place]z coude was placed.  Retrograde

showed good filling, good drainage, brought to recovery in stable condition.





DD: 12/28/2020 09:30

DT: 12/28/2020 09:56

JOB# [id number]  [id number]

ELMER/BARBARA

## 2021-10-18 LAB
ALBUMIN SERPL-MCNC: 4 G/DL (ref 3.9–5)
ALT SERPL-CCNC: 14 UNITS/L (ref 7–56)
BUN SERPL-MCNC: 18 MG/DL (ref 9–20)
BUN/CREAT SERPL: 16 %
CALCIUM SERPL-MCNC: 9.5 MG/DL (ref 8.4–10.2)
HCT VFR BLD CALC: 33.8 % (ref 35.5–45.6)
HEMOLYSIS INDEX: 17
HGB BLD-MCNC: 10.9 GM/DL (ref 11.8–15.2)
MCHC RBC AUTO-ENTMCNC: 32 % (ref 32–34)
MCV RBC AUTO: 84 FL (ref 84–94)
PLATELET # BLD: 222 K/MM3 (ref 140–440)
RBC # BLD AUTO: 4.02 M/MM3 (ref 3.65–5.03)

## 2021-10-18 NOTE — ANESTHESIA CONSULTATION
Anesthesia Consult and Med Hx


Date of service: 10/20/21





- Airway


Anesthetic Teeth Evaluation: Dentures, Edentulous


ROM Head & Neck: Adequate


Mental/Hyoid Distance: Adequate


Mallampati Class: Class II


Intubation Access Assessment: Good





- Pre-Operative Health Status


ASA Pre-Surgery Classification: ASA3


Proposed Anesthetic Plan: General





- Pulmonary


Hx Smoking: Yes (STOPPED X 60 YRS)


Hx Respiratory Symptoms: No


Hx Sleep Apnea: No (JUAN M PRE SCREEN HIGH RISK  )





- Cardiovascular System


Hx Hypertension: Yes (+Cardiac clearance)


Hx Coronary Artery Disease: Yes (CABG x3 (2014),  L CEA 6 months ago. R CEA last

year)


Hx Heart Attack/AMI: No (recent stress test negative-see cardiac records. EF 

52%)


Hx Angina: No


Hx Percutaneous Transluminal Coronary Angioplasty (PTCA): Yes (>10 yrs ago)


Hx Cardia Arrhythmia: No


Hx Valvular Heart Disease: Yes (Aortic valve replacement (2014))


Hx Peripheral Vascular Disease: Yes (VARICOSITIES OF LEGS)





- Central Nervous System


CVA: No


Hx Back Pain: Yes


Hx Psychiatric Problems: No





- Gastrointestinal


Hx Gastroesophageal Reflux Disease: Yes (diet controlled)





- Endocrine


Hx Renal Disease: Yes (kidney stones, h/o bladder CA, prostate CA)


Hx Liver Disease: No


Hx Insulin Dependent Diabetes: Yes


Hx Thyroid Disease: No





- Hematic


Hx Anemia: Yes





- Other Systems


Hx Alcohol Use: No


Hx Substance Use: No


Hx Cancer: Yes (hx bladder ca, Prostate CA (2009) )


Hx Obesity: No





- Additional Comments


Anesthesia Medical History Comments: Here multiple times; last 12/2020

## 2021-10-20 ENCOUNTER — HOSPITAL ENCOUNTER (OUTPATIENT)
Dept: HOSPITAL 5 - OR | Age: 81
Discharge: HOME | End: 2021-10-20
Attending: UROLOGY
Payer: MEDICARE

## 2021-10-20 VITALS — SYSTOLIC BLOOD PRESSURE: 159 MMHG | DIASTOLIC BLOOD PRESSURE: 88 MMHG

## 2021-10-20 DIAGNOSIS — N32.89: Primary | ICD-10-CM

## 2021-10-20 DIAGNOSIS — I10: ICD-10-CM

## 2021-10-20 DIAGNOSIS — N30.20: ICD-10-CM

## 2021-10-20 DIAGNOSIS — Z87.442: ICD-10-CM

## 2021-10-20 DIAGNOSIS — Z20.822: ICD-10-CM

## 2021-10-20 DIAGNOSIS — Z95.5: ICD-10-CM

## 2021-10-20 DIAGNOSIS — Z95.4: ICD-10-CM

## 2021-10-20 DIAGNOSIS — Z79.4: ICD-10-CM

## 2021-10-20 DIAGNOSIS — Z85.51: ICD-10-CM

## 2021-10-20 DIAGNOSIS — K21.9: ICD-10-CM

## 2021-10-20 DIAGNOSIS — Z87.891: ICD-10-CM

## 2021-10-20 DIAGNOSIS — Z79.899: ICD-10-CM

## 2021-10-20 DIAGNOSIS — N32.0: ICD-10-CM

## 2021-10-20 DIAGNOSIS — I73.9: ICD-10-CM

## 2021-10-20 DIAGNOSIS — Z79.82: ICD-10-CM

## 2021-10-20 DIAGNOSIS — Z92.3: ICD-10-CM

## 2021-10-20 DIAGNOSIS — Z98.890: ICD-10-CM

## 2021-10-20 DIAGNOSIS — Z95.1: ICD-10-CM

## 2021-10-20 DIAGNOSIS — I25.10: ICD-10-CM

## 2021-10-20 DIAGNOSIS — E11.9: ICD-10-CM

## 2021-10-20 DIAGNOSIS — Z85.46: ICD-10-CM

## 2021-10-20 PROCEDURE — 85027 COMPLETE CBC AUTOMATED: CPT

## 2021-10-20 PROCEDURE — 74018 RADEX ABDOMEN 1 VIEW: CPT

## 2021-10-20 PROCEDURE — 52204 CYSTOSCOPY W/BIOPSY(S): CPT

## 2021-10-20 PROCEDURE — 82962 GLUCOSE BLOOD TEST: CPT

## 2021-10-20 PROCEDURE — 88305 TISSUE EXAM BY PATHOLOGIST: CPT

## 2021-10-20 PROCEDURE — 80053 COMPREHEN METABOLIC PANEL: CPT

## 2021-10-20 PROCEDURE — 36415 COLL VENOUS BLD VENIPUNCTURE: CPT

## 2021-10-20 PROCEDURE — U0003 INFECTIOUS AGENT DETECTION BY NUCLEIC ACID (DNA OR RNA); SEVERE ACUTE RESPIRATORY SYNDROME CORONAVIRUS 2 (SARS-COV-2) (CORONAVIRUS DISEASE [COVID-19]), AMPLIFIED PROBE TECHNIQUE, MAKING USE OF HIGH THROUGHPUT TECHNOLOGIES AS DESCRIBED BY CMS-2020-01-R: HCPCS

## 2021-10-20 NOTE — OPERATIVE REPORT
DATE OF SURGERY: 10/20/2021



PREOPERATIVE DIAGNOSES:  History of bladder cancer, erythematous areas.



POSTOPERATIVE DIAGNOSES:  History of bladder cancer, erythematous areas.



PROCEDURES:  Cystoscopy, diffuse biopsies and minimal dilatation of bladder 

neck.



SURGEON:  Grey Knight MD



ANESTHESIA:  General.



FINDINGS:  This is a gentleman with a history of prostate cancer, history of 

previous radiation and seed implantation.  He had multifocal bladder tumors, 

which were low-grade superficial.  He now presents for followup cystoscopy.



DESCRIPTION OF PROCEDURE:  The patient was brought to the operating room and 

placed on the operating table.  Following induction of anesthesia, placed in 

lithotomy position, prepped and draped in usual sterile fashion.  Bladder neck 

was slightly narrowed.  We entered the bladder. There were some old scars and 

some erythematous areas.  No papillary lesions were noted of significance.  

There is national shortage of cone tips.  We did not want to manipulate too much

the orifices.  There were no tumors around the orifices.  There were no tumors 

seen, just some erythema, which multifocal biopsies were done.  Area was 

cauterized.  The patient tolerated the procedure well and brought to recovery in

stable condition.







DD: 10/20/2021 01:52 PM

DT: 10/20/2021 02:03 PM

TID: [id number] RECEIPT: [id number]

ELMER/DEBRA

## 2021-10-20 NOTE — XRAY REPORT
XR abdomen 1V ap



Technique: Intraoperative fluoroscopic guidance was provided.



Fluoroscopy time: 0.1 minutes. Fluoroscopy images: 2.



Findings/Impression: Intraoperative fluoroscopic guidance for cystogram/TURBT. Please see procedure r
eport for further details.



 



Signer Name: Alli Gill MD 

Signed: 10/20/2021 3:02 PM

Workstation Name: [id number]

## 2021-10-20 NOTE — POST OPERATIVE NOTE
Date of procedure: 10/20/21


Pre-op diagnosis: bladder cancer 


Post-op diagnosis: same


Procedure: 





cysto biopsies 


Anesthesia: GETA


Surgeon: HANG GRAJEDA


Estimated blood loss: none


Pathology: list (bladder)


Specimen disposition: to lab


Condition: stable


Disposition: PACU

## 2021-10-20 NOTE — DISCHARGE SUMMARY
Short Stay Discharge Plan


Activity: other (no straining )


Weight Bearing Status: Full Weight Bearing


Diet: low fat, low cholesterol, low salt


Special Instructions: other (inc fluids )


Follow up with: 


CLIFF DAVIS JR, MD [Primary Care Provider] - 7 Days


HANG GRAJEDA MD [Staff Physician] - 10/25/21

## 2022-03-03 LAB
ALBUMIN SERPL-MCNC: 4.1 G/DL (ref 3.9–5)
ALT SERPL-CCNC: 11 UNITS/L (ref 7–56)
BUN SERPL-MCNC: 29 MG/DL (ref 9–20)
BUN/CREAT SERPL: 24 %
CALCIUM SERPL-MCNC: 9.7 MG/DL (ref 8.4–10.2)
HCT VFR BLD CALC: 32.4 % (ref 35.5–45.6)
HEMOLYSIS INDEX: 13
HGB BLD-MCNC: 10.7 GM/DL (ref 11.8–15.2)
MCHC RBC AUTO-ENTMCNC: 33 % (ref 32–34)
MCV RBC AUTO: 86 FL (ref 84–94)
PLATELET # BLD: 214 K/MM3 (ref 140–440)
RBC # BLD AUTO: 3.76 M/MM3 (ref 3.65–5.03)

## 2022-03-03 NOTE — ANESTHESIA CONSULTATION
Anesthesia Consult and Med Hx


Date of service: 03/03/22





- Airway


Anesthetic Teeth Evaluation: Dentures (upper and lower)


ROM Head & Neck: Adequate


Mental/Hyoid Distance: Adequate


Mallampati Class: Class III


Intubation Access Assessment: Possibly Difficult





- Pre-Operative Health Status


ASA Pre-Surgery Classification: ASA3


Proposed Anesthetic Plan: General





- Pulmonary


Hx Smoking: Yes (STOPPED X 60 YRS)


Hx Respiratory Symptoms: No


SOB: Yes (SOB WITH ACTIVITY)


Hx Sleep Apnea: No (JUAN M PRE SCREEN HIGH RISK  -SNORES)





- Cardiovascular System


Hx Hypertension: Yes


Hx Coronary Artery Disease: Yes (CABG x3 (2014),  )


Hx Heart Attack/AMI: No (recent stress test negative-see cardiac records. EF 

52%)


Hx Angina: No


Hx Percutaneous Transluminal Coronary Angioplasty (PTCA): Yes (>10 yrs ago)


Hx Cardia Arrhythmia: No


Hx Valvular Heart Disease: Yes (Aortic valve replacement (2014))


Hx Peripheral Vascular Disease: Yes (carotid arteriey stenosis, VARICOSITIES OF 

LEGS)





- Central Nervous System


CVA: No


Hx Back Pain: Yes (knee arthritis, bilateral knee pain)


Hx Psychiatric Problems: No





- Gastrointestinal


Hx Gastroesophageal Reflux Disease: Yes (diet controlled)





- Endocrine


Hx Renal Disease: Yes (kidney stones, h/o bladder CA, prostate CA)


Hx Liver Disease: No


Hx Insulin Dependent Diabetes: Yes


Hx Thyroid Disease: No





- Hematic


Hx Anemia: Yes


Hx Sickle Cell Disease: No





- Other Systems


Hx Alcohol Use: No


Hx Substance Use: No


Hx Cancer: Yes (hx bladder ca, Prostate CA  )


Hx Obesity: Yes

## 2022-03-09 ENCOUNTER — HOSPITAL ENCOUNTER (OUTPATIENT)
Dept: HOSPITAL 5 - OR | Age: 82
Discharge: HOME | End: 2022-03-09
Attending: UROLOGY
Payer: MEDICARE

## 2022-03-09 VITALS — SYSTOLIC BLOOD PRESSURE: 144 MMHG | DIASTOLIC BLOOD PRESSURE: 73 MMHG

## 2022-03-09 DIAGNOSIS — K21.9: ICD-10-CM

## 2022-03-09 DIAGNOSIS — Z20.822: ICD-10-CM

## 2022-03-09 DIAGNOSIS — Z87.891: ICD-10-CM

## 2022-03-09 DIAGNOSIS — E66.9: ICD-10-CM

## 2022-03-09 DIAGNOSIS — E11.9: ICD-10-CM

## 2022-03-09 DIAGNOSIS — M19.90: ICD-10-CM

## 2022-03-09 DIAGNOSIS — Z95.1: ICD-10-CM

## 2022-03-09 DIAGNOSIS — C67.3: Primary | ICD-10-CM

## 2022-03-09 DIAGNOSIS — C67.2: ICD-10-CM

## 2022-03-09 DIAGNOSIS — Z98.890: ICD-10-CM

## 2022-03-09 DIAGNOSIS — E78.00: ICD-10-CM

## 2022-03-09 DIAGNOSIS — Z98.42: ICD-10-CM

## 2022-03-09 DIAGNOSIS — Z98.41: ICD-10-CM

## 2022-03-09 DIAGNOSIS — D64.9: ICD-10-CM

## 2022-03-09 DIAGNOSIS — Z79.82: ICD-10-CM

## 2022-03-09 DIAGNOSIS — C67.4: ICD-10-CM

## 2022-03-09 DIAGNOSIS — I10: ICD-10-CM

## 2022-03-09 DIAGNOSIS — Z79.899: ICD-10-CM

## 2022-03-09 DIAGNOSIS — Z79.4: ICD-10-CM

## 2022-03-09 DIAGNOSIS — Z90.49: ICD-10-CM

## 2022-03-09 PROCEDURE — 82962 GLUCOSE BLOOD TEST: CPT

## 2022-03-09 PROCEDURE — 36415 COLL VENOUS BLD VENIPUNCTURE: CPT

## 2022-03-09 PROCEDURE — 85027 COMPLETE CBC AUTOMATED: CPT

## 2022-03-09 PROCEDURE — 74018 RADEX ABDOMEN 1 VIEW: CPT

## 2022-03-09 PROCEDURE — 52224 CYSTOSCOPY AND TREATMENT: CPT

## 2022-03-09 PROCEDURE — 88305 TISSUE EXAM BY PATHOLOGIST: CPT

## 2022-03-09 PROCEDURE — U0003 INFECTIOUS AGENT DETECTION BY NUCLEIC ACID (DNA OR RNA); SEVERE ACUTE RESPIRATORY SYNDROME CORONAVIRUS 2 (SARS-COV-2) (CORONAVIRUS DISEASE [COVID-19]), AMPLIFIED PROBE TECHNIQUE, MAKING USE OF HIGH THROUGHPUT TECHNOLOGIES AS DESCRIBED BY CMS-2020-01-R: HCPCS

## 2022-03-09 PROCEDURE — 80053 COMPREHEN METABOLIC PANEL: CPT

## 2022-03-09 NOTE — POST OPERATIVE NOTE
Date of procedure: 03/09/22


Pre-op diagnosis: bladder cancer 


Post-op diagnosis: same


Findings: 





2 pipillary tumors 


Procedure: 





cysto turbt


Anesthesia: GETA


Surgeon: HANG GRAJEDA


Pathology: list (bt)


Specimen disposition: to lab


Condition: stable


Disposition: PACU

## 2022-03-09 NOTE — XRAY REPORT
XR abdomen 1V ap



Technique: Intraoperative fluoroscopic guidance was provided.



Fluoroscopy time: 0.02 seconds. Fluoroscopy images: 1.



Findings/Impression: Intraoperative fluoroscopic guidance for Dr. Knight. Please see procedure repo
rt for further details.



Signer Name: Alli Gill MD 

Signed: 3/9/2022 4:04 PM

Workstation Name: [id number]

## 2022-03-09 NOTE — OPERATIVE REPORT
DATE OF SURGERY: 03/09/2022



PREOPERATIVE DIAGNOSIS:  Recurrent bladder tumors.



POSTOPERATIVE DIAGNOSIS:  Recurrent bladder tumors.



PROCEDURES:  Cystoscopy, resection of tumors and fulguration.



SURGEON:  Grey Knight MD



ANESTHESIA:  General.



FINDINGS:  This is a gentleman with history of multiple papillary tumors.  He 

was found to have one at the anterior towards the dome and one on the left 

lateral wall.  Now presents for treatment.



DESCRIPTION OF PROCEDURE:  The patient was brought to operating room and placed 

on the operating table.  Following induction of anesthesia, placed in lithotomy 

position, prepped and draped in usual sterile fashion.  Cystourethroscopy showed

these tumors to be difficult because they were anterior, left lateral and right 

at the dome. With pressure, we were able to see the tumor.  The one at the dome 

was small.  We were able to extract it with a large grasper and cauterize it.  

We did see some fat.  The one on the left, we resected. The patient tolerated 

the procedure well and brought to recovery room in stable condition.  No 

significant bleeding.  Area was cauterized well away from the trigone.  We did 

not need to do a retrograde again.  He was brought to recovery room with Ronquillo 

draining clear urine in stable condition.







DD: 03/09/2022 02:45 PM

DT: 03/09/2022 02:54 PM

TID: [id number] RECEIPT: [id number]

ELMER/OSIRIS

## 2022-03-09 NOTE — DISCHARGE SUMMARY
Short Stay Discharge Plan


Activity: other (no straining )


Weight Bearing Status: Full Weight Bearing


Diet: low fat, low cholesterol, low salt


Special Instructions: other (inc fluids )


Durable Medical Equipment Needed Upon Discharge: other (davis )


Follow up with: 


CLIFF DAVIS JR, MD [Primary Care Provider] - 7 Days


HANG GRAJEDA MD [Staff Physician] - 03/21/22

## 2022-08-17 NOTE — OPERATIVE REPORT
DATE OF SURGERY: 08/17/2022



PREOPERATIVE DIAGNOSES:  Multifocal superficial bladder tumors, significant 

comorbidities.



POSTOPERATIVE DIAGNOSES:  Multifocal superficial bladder tumors, significant 

comorbidities.



PROCEDURES:  Cystoscopy, excision and resection of bladder tumors 

transurethrally.



SURGEON:  Grey Knight M.D.



ANESTHESIA:  General.



DESCRIPTION OF PROCEDURE:  This is a gentleman with multifocal bladder tumors 

that keep recurring.  There is 2 small tumors at the bladder neck, which were 

resected after we entered the bladder with the visual obturator.  Once these 

were resected, there was a tumor approximately 1 cm towards the dome, which was 

excised and 2 other ones towards the dome and posterior wall, which were excised

and cauterized.  The patient tolerated the procedure well.  There was no 

bleeding.  We had to relax a little bit and put some pressure on the dome 

because this was under LMA to get access to cauterize.  Retrograde showed 

delicate collecting system.  The patient tolerated the procedure well.  No 

significant complication, brought to recovery in stable condition.  Ronquillo 

catheter draining clear.







DD: 08/17/2022 03:31 PM

DT: 08/17/2022 05:19 PM

TID: [id number] RECEIPT: [id number]

ELMER/ADI

## 2022-08-17 NOTE — POST OPERATIVE NOTE
Date of procedure: 08/17/22


Pre-op diagnosis: bladder ca


Post-op diagnosis: same


Findings: 





multifocal tcc 


Procedure: 





cyst rpg turbt excisons 


Anesthesia: GETA


Surgeon: HANG GRAJEDA


Estimated blood loss: minimal


Pathology: list (bladder)


Specimen disposition: to lab

## 2022-08-17 NOTE — DISCHARGE SUMMARY
Short Stay Discharge Plan


Activity: other (no strAINING )


Weight Bearing Status: Full Weight Bearing


Diet: low fat, low cholesterol, low salt


Special Instructions: other (inc fluids )


Durable Medical Equipment Needed Upon Discharge: other (home with cath x 4 days 

)


Follow up with: 


CLIFF DAVIS JR, MD [Primary Care Provider] - 7 Days


HANG GRAJEDA MD [Staff Physician] - 08/23/22

## 2022-08-17 NOTE — FLUOROSCOPY REPORT
INTRAOPERATIVE FLUOROSCOPY: RETROGRADE UROGRAPHY



INDICATION:

BILAT RPG CYSTO TURBT.



TECHNIQUE:

Intraoperative spot images were obtained during the procedure.



FINDINGS:

There is expected opacification of the ureters and collecting systems. Please see the operative repor
t for further details.



Fluoroscopy Time: 34 seconds. Fluoroscopy Images: 6.



Signer Name: Rogelio Lin MD 

Signed: 8/17/2022 4:23 PM

Workstation Name: [email or web address]

## 2022-08-17 NOTE — ANESTHESIA CONSULTATION
Anesthesia Consult and Med Hx


Date of service: 08/17/22





- Airway


Anesthetic Teeth Evaluation: Dentures (upper and lower)


ROM Head & Neck: Adequate


Mental/Hyoid Distance: Adequate


Mallampati Class: Class III


Intubation Access Assessment: Possibly Difficult (previous LMA 4)





- Pre-Operative Health Status


ASA Pre-Surgery Classification: ASA3


Proposed Anesthetic Plan: General





- Pulmonary


Hx Smoking: Yes (remote smoking hx)


Hx Respiratory Symptoms: No


SOB: Yes (chronic JULIAN; unchanged)





- Cardiovascular System


Hx Hypertension: Yes


Hx Coronary Artery Disease: Yes (3v CABG in 2014)


Hx Heart Attack/AMI: No


Hx Percutaneous Transluminal Coronary Angioplasty (PTCA): Yes (>10 yrs ago; last

dose ASA 8/15/22)


Hx Cardia Arrhythmia: No


Hx Valvular Heart Disease: Yes (AVR 2014)


Hx Peripheral Vascular Disease: Yes (GERMAINE)





- Central Nervous System


CVA: No





- Gastrointestinal


Hx Gastroesophageal Reflux Disease: Yes (asymptomatic today)





- Endocrine


Hx Renal Disease: No


Hx Liver Disease: No


Hx Insulin Dependent Diabetes: Yes


Hx Thyroid Disease: No





- Other Systems


Hx Cancer: Yes (hx bladder ca, Prostate CA  )


Hx Obesity: Yes (BMI 31)





- Additional Comments


Anesthesia Medical History Comments: No hx anesthetic complications. Had similar

procedure under [place] 03/2022. No significant change in health in the interim.